# Patient Record
(demographics unavailable — no encounter records)

---

## 2025-04-22 NOTE — REVIEW OF SYSTEMS
How Severe Is Your Acne?: mild
Is This A New Presentation, Or A Follow-Up?: Follow Up Acne
[Negative] : Heme/Lymph
[Negative] : Heme/Lymph

## 2025-04-23 NOTE — PLAN
[FreeTextEntry1] : 45 year old for an annual. stable  Health care maintenance -vit D/exercise/calcium -breast mammo/sono -colon screening reviewed -f/u PCP for annual and appropriate immunizations -rto 1 year

## 2025-04-23 NOTE — END OF VISIT
[FreeTextEntry3] :  I, Julia Johnston, acted as a scribe on behalf of Dr. Zhang Bernabe M.D. on 04/22/2025.   All medical entries made by the scribe were at my Dr. Zhang Bernabe M.D direction and personally dictated by me on 04/22/2025. I have reviewed the chart and agree that the record accurately reflects my personal performance of the history, physical exam, assessment and plan. I have also personally directed, reviewed, and agreed with the chart.

## 2025-04-23 NOTE — PHYSICAL EXAM
[Appropriately responsive] : appropriately responsive [Alert] : alert [No Acute Distress] : no acute distress [Soft] : soft [Non-tender] : non-tender [Non-distended] : non-distended [No HSM] : No HSM [No Lesions] : no lesions [No Mass] : no mass [Oriented x3] : oriented x3 [Examination Of The Breasts] : a normal appearance [No Masses] : no breast masses were palpable [Labia Majora] : normal [Labia Minora] : normal [Normal] : normal [Absent] : absent [Uterine Adnexae] : normal [FreeTextEntry3] :  No thyromegaly

## 2025-04-23 NOTE — HISTORY OF PRESENT ILLNESS
[FreeTextEntry1] : 45 year old presents for an annual. She is doing well and has no complaints.  OBHx: 2012-c/s, 2011-sab, 2017 repeat c/s GYNHx: fibroid uterus, h/o Hepatitis B PSH: (9/15/2023) total laparoscopic hysterectomy, bilateral salpingectomy, cystoscopy, and lysis of pelvic adhesions.